# Patient Record
Sex: FEMALE | Race: WHITE | NOT HISPANIC OR LATINO | URBAN - METROPOLITAN AREA
[De-identification: names, ages, dates, MRNs, and addresses within clinical notes are randomized per-mention and may not be internally consistent; named-entity substitution may affect disease eponyms.]

---

## 2023-04-27 ENCOUNTER — OFFICE VISIT (OUTPATIENT)
Dept: OBGYN CLINIC | Facility: CLINIC | Age: 23
End: 2023-04-27

## 2023-04-27 VITALS
SYSTOLIC BLOOD PRESSURE: 118 MMHG | BODY MASS INDEX: 27.49 KG/M2 | HEIGHT: 64 IN | WEIGHT: 161 LBS | DIASTOLIC BLOOD PRESSURE: 70 MMHG

## 2023-04-27 DIAGNOSIS — N76.4 LABIAL ABSCESS: Primary | ICD-10-CM

## 2023-04-27 RX ORDER — CEPHALEXIN 500 MG/1
CAPSULE ORAL
Qty: 14 CAPSULE | Refills: 0 | Status: SHIPPED | OUTPATIENT
Start: 2023-04-27 | End: 2023-05-10

## 2023-04-27 RX ORDER — CLINDAMYCIN PHOSPHATE 10 MG/ML
SOLUTION TOPICAL
COMMUNITY
Start: 2023-04-14

## 2023-04-27 RX ORDER — NORGESTIMATE AND ETHINYL ESTRADIOL 0.25-0.035
1 KIT ORAL DAILY
COMMUNITY
Start: 2023-03-13

## 2023-04-27 RX ORDER — MINOCYCLINE HYDROCHLORIDE 100 MG/1
100 CAPSULE ORAL EVERY 12 HOURS SCHEDULED
COMMUNITY

## 2023-04-27 NOTE — PROGRESS NOTES
This is a 49-year-old white female, she is a senior student at [employer]  She is now complaining of 2-day duration of increasing bump on her external genitalia  Somewhat painful  Inspection of the external genitalia shows an abscess at the superior side of the left labia majora  It is tense  It is at the 1 o'clock position of the perineum  Greatest diameter approximately 2 centimeters  Her menses are present and a tampon is present  A vaginal culture was not done  Consent was given for incision and drainage of the abscess  Incision and drain    Date/Time: 4/27/2023 9:30 AM  Performed by: Brandi Jones MD  Authorized by: Brandi Jones MD   Universal Protocol:  Consent: Verbal consent obtained  Consent given by: patient  Timeout called at: 4/27/2023 10:20 AM   Patient understanding: patient states understanding of the procedure being performed  Patient consent: the patient's understanding of the procedure matches consent given  Procedure consent: procedure consent matches procedure scheduled  Relevant documents: relevant documents present and verified  Site marked: the operative site was not marked  Radiology Images displayed and confirmed  If images not available, report reviewed: imaging studies not available  Patient identity confirmed: verbally with patient      Patient location:  Clinic  Location:     Type:  Abscess    Location:  Anogenital    Anogenital location: Left labia majora, 1 o'clock position of the perineum  Pre-procedure details:     Skin preparation:  Betadine  Anesthesia (see MAR for exact dosages):      Anesthesia method:  Local infiltration    Local anesthetic:  Lidocaine 1% w/o epi  Procedure details:     Complexity:  Simple    Needle aspiration: no      Incision types:  Stab incision and single straight    Scalpel blade:  15    Approach:  Puncture    Incision depth:  Subcutaneous    Wound management:  Probed and deloculated    Drainage:  Purulent    Drainage amount: Moderate    Wound treatment:  Wound left open    Packing materials:  None  Post-procedure details:     Patient tolerance of procedure: Tolerated well, no immediate complications    Complication (if applicable):  None  Comments:      Purulent discharge was expressed  By completion the abscess are greatly reduced in size  Her pain was better  She tolerated it well  We will start her on Keflex 500 mg twice daily  Awaiting results of the culture  I will call her tomorrow for progress report  She will wear loose fitting garments  She may apply ice  She will be seen back in my office on May 1

## 2023-04-27 NOTE — PATIENT INSTRUCTIONS
The patient was informed of a labial abscess  He given consent for incision and drainage  Purulent discharge was noted  Cultures were taken  The bleeding was well controlled  Will start on Keflex 500 mg twice daily for the next 7 days  She may apply ice pack  I want to see her back in my office on May 1 of this year  I will contact her tomorrow with a progress report

## 2023-05-01 ENCOUNTER — OFFICE VISIT (OUTPATIENT)
Dept: OBGYN CLINIC | Facility: CLINIC | Age: 23
End: 2023-05-01

## 2023-05-01 VITALS
SYSTOLIC BLOOD PRESSURE: 118 MMHG | DIASTOLIC BLOOD PRESSURE: 76 MMHG | HEIGHT: 64 IN | BODY MASS INDEX: 28.17 KG/M2 | WEIGHT: 165 LBS

## 2023-05-01 DIAGNOSIS — N73.9 ABSCESS OF FEMALE GENITALIA: Primary | ICD-10-CM

## 2023-05-01 LAB — BACTERIA GENITAL AEROBE CULT: NORMAL

## 2023-05-01 NOTE — PROGRESS NOTES
This is a 24-year-old lady [facility] harjinder who returns today for follow-up after incision and drainage of an abscess on the left labia majora  She underwent I&D on 27 April  She was started on Keflex 500 mg twice daily  She returns today for follow-up  She states that her pain is greatly improved at the end drainage  She denies any fever or chills  There is no problem with voiding  There is no more drainage  Inspection of the external genitalia shows there has been a great response  The abscess is now absent  The skin is healing well  There is no erythematous tissue  The the clitoral scott looks to be healing well  Impression doing well status post incision and drainage  Culture results are pending  No issues at the present time  The patient will finish out her finals next week  She will be allowed to resume sexual activity after week  She was instructed to change her exercise clothing more frequently  She will keep me informed any more issues occur  Return my office on a as needed basis if she still in town  Previsit chart review 2 minutes  Face-to-face 5 minutes    Post charting 2 minutes

## 2023-05-01 NOTE — PATIENT INSTRUCTIONS
The patient was informed of negative findings on her abscess today  There is a good healing response  There is no more tenderness  There is no more pain  There is no more drainage  There is no problem with voiding  She may resume sexual activity after 10 days  She should continue the antibiotics till completed  Return to my office on a as needed basis